# Patient Record
Sex: FEMALE | Race: WHITE | NOT HISPANIC OR LATINO | Employment: FULL TIME | URBAN - METROPOLITAN AREA
[De-identification: names, ages, dates, MRNs, and addresses within clinical notes are randomized per-mention and may not be internally consistent; named-entity substitution may affect disease eponyms.]

---

## 2023-04-26 NOTE — PROGRESS NOTES
New Shoulder      Patient: Glendy YUAN        YOB: 2000    Medical Record Number: [id number]        Chief Complaints: Left shoulder pain    History of Present Illness: This is a 23-year-old female who is right-hand dominant presents with left shoulder pain this ongoing for over a year mostly in the area the left sternoclavicular joint.  She has noticed a prominence almost a mass there that is tender she does have night pain she is a  and she is just doing management job but does not bother her but if she does a lot of lifting it does bother her any kind of activities bother her.  She is done anti-inflammatories ice rest stretching strengthening all with no lasting improvement.  She did see another orthopedist x-rays were done which were normal CT was done which was normal.  She still clearly has a an enlarged area in the left clavicular joint symptoms are moderate intermittent deafly activity related      Allergies:   Allergies   Allergen Reactions   • Prednisone Shortness Of Breath       Medications:   Home Medications:  Current Outpatient Medications on File Prior to Visit   Medication Sig   • Chlorcyclizine-Pseudoephed 25-60 MG tablet Take 1 tablet by mouth Every 8 (Eight) Hours As Needed (congestion).     No current facility-administered medications on file prior to visit.     Current Medications:  Scheduled Meds:  Continuous Infusions:No current facility-administered medications for this visit.    PRN Meds:.    No past medical history on file.     Past Surgical History:   Procedure Laterality Date   • WISDOM TOOTH EXTRACTION          Social History     Occupational History   • Not on file   Tobacco Use   • Smoking status: Never   • Smokeless tobacco: Never   Substance and Sexual Activity   • Alcohol use: Not on file   • Drug use: Not on file   • Sexual activity: Not on file      Social History     Social History Narrative   • Not on file      No family history on  file.          Review of Systems:     Review of Systems      Physical Exam: 23 y.o. female  General Appearance:    Alert, cooperative, in no acute distress                 There were no vitals filed for this visit.   Patient is alert and read ×3 no acute distress appears her above-listed at height weight and age.  Affect is normal respiratory rate is normal unlabored. Heart rate regular rate rhythm, sclera, dentition and hearing are normal for the purpose of this exam.    Ortho Exam exam of the left shoulder she is a little limited in forward flexion to about 170 passive get her to 180 as she has pain in the sternoclavicular joint abduction similar external rotation to 50 rotator cuff strength is 4+/5 with pain in the sternoclavicular joint she does have a prominence of the left SC joint she has palpable tenderness in that area she does have pain with horizontal adduction and I do not feel that that particular joint is unstable    Procedures          Radiology: AP of the left shoulder were brought with her these look like there is maybe a slightly bigger medial clavicle on the left than the right but this might also be rotation she also has a CT scan which I only have the report which is normal  Assessment/Plan: Prominence left sternoclavicular joint in view of the fact that the bone is normal there is clearly a difference in size I would worry about soft tissue plan is to proceed with an MRI I will going to give her a topical anti-inflammatory just to see if we can give her some relief while working this up.  I would be worried there is a soft tissue tumor.

## 2023-04-28 ENCOUNTER — OFFICE VISIT (OUTPATIENT)
Dept: ORTHOPEDIC SURGERY | Facility: CLINIC | Age: 23
End: 2023-04-28
Payer: COMMERCIAL

## 2023-04-28 VITALS — HEIGHT: 67 IN | WEIGHT: 140 LBS | BODY MASS INDEX: 21.97 KG/M2 | TEMPERATURE: 97.6 F

## 2023-04-28 DIAGNOSIS — M89.8X1 PAIN OF LEFT CLAVICLE: Primary | ICD-10-CM

## 2023-04-28 DIAGNOSIS — M25.512 PAIN OF LEFT STERNOCLAVICULAR JOINT: ICD-10-CM

## 2023-04-28 PROCEDURE — 99203 OFFICE O/P NEW LOW 30 MIN: CPT | Performed by: ORTHOPAEDIC SURGERY

## 2023-05-19 ENCOUNTER — HOSPITAL ENCOUNTER (OUTPATIENT)
Dept: MRI IMAGING | Facility: HOSPITAL | Age: 23
Discharge: HOME | End: 2023-05-19
Payer: COMMERCIAL

## 2023-05-19 DIAGNOSIS — M25.512 PAIN OF LEFT STERNOCLAVICULAR JOINT: ICD-10-CM

## 2023-05-19 PROCEDURE — 71550 MRI CHEST W/O DYE: CPT

## 2023-05-31 ENCOUNTER — TELEPHONE (OUTPATIENT)
Dept: ORTHOPEDIC SURGERY | Facility: CLINIC | Age: 23
End: 2023-05-31

## 2023-05-31 NOTE — TELEPHONE ENCOUNTER
----- Message from Shanna Shipley MD sent at 5/30/2023 11:49 AM EDT -----  Please tell her the MRI shows that she does have arthritis at that sternoclavicular joint which is making that more prominent if that continues to bother her we could try to set up an injection under ultrasound.  She could also do over-the-counter anti-inflammatories, topical anti-inflammatories and activity modification.  I am happy to discuss further in follow-up

## 2023-06-07 NOTE — PROGRESS NOTES
Left Shoulder MRI Follow Up      Patient: Glendy YUAN        YOB: 2000            Chief Complaints: Shoulder pain left      History of Present Illness: The patient is here follow-up of an MRI of the shoulder MRI demonstrates some sternoclavicular arthritis is really an MRI of her chest there is no mass she states she is actually doing better with regard to symptoms      Physical Exam: 23 y.o. female  General Appearance:    Alert, cooperative, in no acute distress                 There were no vitals filed for this visit.     Patient is alert and read ×3 no acute distress appears her above-listed at height weight and age.  Affect is normal respiratory rate is normal unlabored. Heart rate regular rate rhythm, sclera, dentition and hearing are normal for the purpose of this exam.      Ortho Exam  Exam of the left shoulder in and of itself is normal she has full range of motion she has a little bit of prominence at the left sternoclavicular joint and some pain she has pain with horizontal adduction no obvious translation anterior posterior of the medial clavicle    MRI Results: MRIs as above have reviewed and agree  Procedures      Assessment/Plan: Left shoulder pain with sternoclavicular arthritis we talked about options including topical anti-inflammatories we talked about injection under ultrasound right now she is doing fine she will call me if her symptoms worsen

## 2023-06-08 ENCOUNTER — OFFICE VISIT (OUTPATIENT)
Dept: ORTHOPEDIC SURGERY | Facility: CLINIC | Age: 23
End: 2023-06-08
Payer: COMMERCIAL

## 2023-06-08 VITALS — TEMPERATURE: 97.8 F | WEIGHT: 136.2 LBS | HEIGHT: 67 IN | BODY MASS INDEX: 21.38 KG/M2

## 2023-06-08 DIAGNOSIS — M19.019 ARTHRITIS OF STERNOCLAVICULAR JOINT: Primary | ICD-10-CM

## 2023-08-18 ENCOUNTER — APPOINTMENT (OUTPATIENT)
Dept: ULTRASOUND IMAGING | Facility: HOSPITAL | Age: 23
End: 2023-08-18
Payer: COMMERCIAL

## 2023-08-18 ENCOUNTER — HOSPITAL ENCOUNTER (EMERGENCY)
Facility: HOSPITAL | Age: 23
Discharge: HOME | End: 2023-08-18
Attending: EMERGENCY MEDICINE
Payer: COMMERCIAL

## 2023-08-18 VITALS
HEIGHT: 67 IN | SYSTOLIC BLOOD PRESSURE: 109 MMHG | DIASTOLIC BLOOD PRESSURE: 72 MMHG | RESPIRATION RATE: 16 BRPM | WEIGHT: 135 LBS | OXYGEN SATURATION: 99 % | HEART RATE: 78 BPM | TEMPERATURE: 98.4 F | BODY MASS INDEX: 21.19 KG/M2

## 2023-08-18 DIAGNOSIS — N93.9 VAGINAL BLEEDING: Primary | ICD-10-CM

## 2023-08-18 LAB
B-HCG UR QL: NEGATIVE
BACTERIA UR QL AUTO: NORMAL /HPF
BILIRUB UR QL STRIP: NEGATIVE
CLARITY UR: CLEAR
COLOR UR: YELLOW
GLUCOSE UR STRIP-MCNC: NEGATIVE MG/DL
HGB UR QL STRIP.AUTO: ABNORMAL
HYALINE CASTS UR QL AUTO: NORMAL /LPF
KETONES UR QL STRIP: NEGATIVE
LEUKOCYTE ESTERASE UR QL STRIP.AUTO: NEGATIVE
NITRITE UR QL STRIP: NEGATIVE
PH UR STRIP.AUTO: 7 [PH] (ref 5–8)
PROT UR QL STRIP: NEGATIVE
RBC # UR STRIP: NORMAL /HPF
REF LAB TEST METHOD: NORMAL
SP GR UR STRIP: 1.01 (ref 1–1.03)
SQUAMOUS #/AREA URNS HPF: NORMAL /HPF
UROBILINOGEN UR QL STRIP: ABNORMAL
WBC # UR STRIP: NORMAL /HPF

## 2023-08-18 PROCEDURE — 93976 VASCULAR STUDY: CPT

## 2023-08-18 PROCEDURE — 87591 N.GONORRHOEAE DNA AMP PROB: CPT | Performed by: EMERGENCY MEDICINE

## 2023-08-18 PROCEDURE — 87491 CHLMYD TRACH DNA AMP PROBE: CPT | Performed by: EMERGENCY MEDICINE

## 2023-08-18 PROCEDURE — 81001 URINALYSIS AUTO W/SCOPE: CPT | Performed by: EMERGENCY MEDICINE

## 2023-08-18 PROCEDURE — 81025 URINE PREGNANCY TEST: CPT | Performed by: EMERGENCY MEDICINE

## 2023-08-18 PROCEDURE — 99284 EMERGENCY DEPT VISIT MOD MDM: CPT

## 2023-08-18 PROCEDURE — 76856 US EXAM PELVIC COMPLETE: CPT

## 2023-08-18 NOTE — DISCHARGE INSTRUCTIONS
Follow-up with OB/GYN as soon as possible to discuss the painful menstrual cycle and vaginal bleeding.

## 2023-08-18 NOTE — FSED PROVIDER NOTE
Subjective   History of Present Illness  The patient is a 23-year-old female with no past medical history who presents with vaginal bleeding.  She states that she is concerned that there could be an issue with her IUD.  She states that the IUD was put in about 3 years ago by a doctor in [place].  She does not currently have an OB/GYN here.  She states that she had a period that ended about a week ago and she states that it was heavier than normal and was associated with more pain than she typically has.  She states that she noticed some bleeding again this morning which is unusual for her.  She states that the pain is located in the lower pelvis in the center.  She reports some diarrhea but denies any fevers, vomiting, or dysuria.  Review of Systems   Constitutional:  Negative for fever.   Gastrointestinal:  Positive for diarrhea and nausea. Negative for vomiting.   Genitourinary:  Positive for pelvic pain and vaginal bleeding.   Skin:  Negative for rash.   All other systems reviewed and are negative.    History reviewed. No pertinent past medical history.    Allergies   Allergen Reactions    Prednisone Shortness Of Breath    Nitrofurantoin Rash       Past Surgical History:   Procedure Laterality Date    INTRAUTERINE DEVICE INSERTION      WISDOM TOOTH EXTRACTION         History reviewed. No pertinent family history.    Social History     Socioeconomic History    Marital status:    Tobacco Use    Smoking status: Never    Smokeless tobacco: Never   Vaping Use    Vaping Use: Every day    Substances: Nicotine    Devices: Disposable   Substance and Sexual Activity    Alcohol use: Yes     Comment: social    Drug use: Never    Sexual activity: Yes     Partners: Male           Objective   Physical Exam  Exam conducted with a chaperone present.   Constitutional:       General: She is not in acute distress.     Appearance: Normal appearance. She is not ill-appearing.   HENT:      Mouth/Throat:      Mouth: Mucous  membranes are moist.   Eyes:      Conjunctiva/sclera: Conjunctivae normal.   Cardiovascular:      Rate and Rhythm: Normal rate and regular rhythm.   Pulmonary:      Effort: Pulmonary effort is normal.      Breath sounds: Normal breath sounds.   Abdominal:      General: There is no distension.      Tenderness: There is no abdominal tenderness.   Genitourinary:     General: Normal vulva.      Comments: Brown vaginal discharge; scant blood from the cervical os; IUD string visualized in the cervical os; no lacerations; nurse present as chaperone  Musculoskeletal:         General: Normal range of motion.      Cervical back: Normal range of motion.   Skin:     Findings: No rash.   Neurological:      General: No focal deficit present.      Mental Status: She is alert.   Psychiatric:         Mood and Affect: Mood normal.       Procedures           ED Course  ED Course as of 08/18/23 1120   Fri Aug 18, 2023   1118 Ultrasound overall unremarkable.  IUD appropriately placed.  Will discharge patient and have her follow-up with OB/GYN.  Patient would like to wait on the results of the STI testing before treatment.  Return precautions given. [DH]      ED Course User Index  [DH] Kamran Gottlieb MD                                           Medical Decision Making  Amount and/or Complexity of Data Reviewed  Radiology: ordered.    23-year-old female presents with vaginal bleeding, pelvic pain, and concern for IUD issue.  Will perform pelvic exam and will check ultrasound of the pelvis to verify proper placement of IUD.  Urinalysis and urine pregnancy test.    Final diagnoses:   Vaginal bleeding       ED Disposition  ED Disposition       ED Disposition   Discharge    Condition   Stable    Comment   --               Marvin Camejo MD  [address]  [facility] [address]  [place] [id number] [phone]      OB/GYN         Medication List      No changes were made to your prescriptions during this visit.

## 2023-08-21 LAB
C TRACH RRNA SPEC QL NAA+PROBE: NEGATIVE
N GONORRHOEA RRNA SPEC QL NAA+PROBE: NEGATIVE